# Patient Record
(demographics unavailable — no encounter records)

---

## 2024-11-07 NOTE — ASSESSMENT
[FreeTextEntry1] : Reviewed MRI in detail.  High-grade partial tear of the rotator cuff as well as injury to the long head of the biceps from the injury lifting heavy luggage.  The surrounding bursitis and the swelling around the biceps sheath again indicated this is an acute injury.  We discussed the findings.  Reasonable to start with conservative measures including a corticosteroid injection in the subacromial space as well as course of physical therapy.  If no improvement in 6 weeks could consider biceps sheath injection or eventually surgical repair.  He is currently working full duty by advised but I advised monitoring the pain to prevent the tear from getting worse.

## 2024-11-07 NOTE — PROCEDURE
[FreeTextEntry3] : - Large joint injection was performed of the left subacromial space.  - The indication for this procedure was pain and inflammation. Patient has tried OTC's including aspirin, Ibuprofen, Aleve, etc or prescription NSAIDS, and/or exercises at home and/or physical therapy without satisfactory response, patient had decreased mobility in the joint and the risks benefits, and alternatives have been discussed, and verbal consent was obtained. The site was prepped with alcohol, betadine, ethyl chloride sprayed topically and sterile technique used.  - An injection of Betamethasone 2cc; Marcaine 5cc injected. - Patient was advised to call if redness, pain or fever occur, apply ice for 15 minutes out of every hour for the next 12-24 hours as tolerated and patient was advised to rest the joint(s) for 2 days.

## 2024-11-07 NOTE — HISTORY OF PRESENT ILLNESS
[de-identified] : Follow up on the MRI Results of the left shoulder today due to WC DOI 9/8/24. Still c/o pain and inflammation in the shoulder with most motions. Currently working

## 2024-11-07 NOTE — IMAGING
[de-identified] : Left Shoulder Exam: Inspection: No swelling, no ecchymosis, no deformity, no scapular winging. Palpation: No tenderness to palpation over shoulder, AC joint or trapezius. Range of motion:  Forward flexion: Active 170 degrees Abduction: Active 150 degrees External rotation (with shoulder abducted): Passive 60 degrees Internal rotation (with shoulder abducted): Passive 70 degrees Strength: 4/5 supraspinatus, 5/5 infraspinatus and 5/5 subscapularis. Special testing: Negative Winston test, positive impingement sign, negative Lopez test, speeds and Yergason negative Motor and sensory intact distally

## 2024-12-19 NOTE — ASSESSMENT
[FreeTextEntry1] : Here for follow-up of High-grade partial tear of the rotator cuff as well as injury to the long head of the biceps from the injury lifting heavy luggage.  No relief with CSI injection done on 11/7/24 Would like to continue PT - new rx sent  If no improvement in 6 weeks could consider biceps sheath injection or eventually surgical repair.   currently working full duty  rtc in 6 weeks   I am working today under the supervision of Dr. Hayes and I am following the plan of care of Dr. Hayes as described by him on this date. Progress note completed by Latasha Cotto PA-C under the supervision of Dr. Hayes.

## 2024-12-19 NOTE — HISTORY OF PRESENT ILLNESS
[Full time] : Work status: full time [de-identified] : Follow up on the MRI Results of the left shoulder today due to WC DOI 9/8/24. Still c/o pain and inflammation in the shoulder with most motions. Currently working  12/19/24: patient is here for the left shoulder. Patient states that they still have pain in the shoulder but physical therapy has been helping with mobility.  [] : This patient has had an injection before: no [de-identified] : pt

## 2024-12-19 NOTE — IMAGING
[de-identified] : Left Shoulder Exam: Inspection: No swelling, no ecchymosis, no deformity, no scapular winging. Palpation: No tenderness to palpation over shoulder, AC joint or trapezius. Range of motion:  Forward flexion: Active 170 degrees Abduction: Active 150 degrees External rotation (with shoulder abducted): Passive 60 degrees Internal rotation (with shoulder abducted): Passive 70 degrees Strength: 4/5 supraspinatus, 5/5 infraspinatus and 5/5 subscapularis. Special testing: Negative Winston test, positive impingement sign, negative Lopez test, speeds and Yergason negative Motor and sensory intact distally

## 2025-02-27 NOTE — HISTORY OF PRESENT ILLNESS
[6] : 6 [5] : 5 [Dull/Aching] : dull/aching [Sharp] : sharp [Full time] : Work status: full time [de-identified] : 2/27/25; patient is here for a follow up of the left shoulder. pain has been slightly better since starting physical therapy.  [] : Post Surgical Visit: no [FreeTextEntry1] : left shoulder  [de-identified] : pt

## 2025-02-27 NOTE — IMAGING
[de-identified] : Left Shoulder Exam: Inspection: No swelling, no ecchymosis, no deformity, no scapular winging. Palpation: No tenderness to palpation over shoulder, AC joint or trapezius. Range of motion:  Forward flexion: Active 170 degrees Abduction: Active 150 degrees External rotation (with shoulder abducted): Passive 60 degrees Internal rotation (with shoulder abducted): Passive 70 degrees Strength: 4/5 supraspinatus, 5/5 infraspinatus and 5/5 subscapularis. Special testing: Negative Winston test, positive impingement sign, negative Lopez test, speeds and Yergason negative Motor and sensory intact distally

## 2025-02-27 NOTE — ASSESSMENT
[FreeTextEntry1] : Here for follow-up of High-grade partial tear of the rotator cuff as well as injury to the long head of the biceps from the injury lifting heavy luggage.  Has been making slow but steady progress with PT  No relief with CSI injection done on 11/7/24 Continue to request uninterrupted PT for strength and ROM  Would like to continue PT at this time  Ibuprofen 600mg sent - r/b/a discussed - instructed how to take medication If no improvement in 6 weeks could consider biceps sheath injection or eventually surgical repair.   currently working full duty  rtc in 6 weeks   I am working today under the supervision of Dr. Hayes and I am following the plan of care of Dr. Hayes as described by him on this date. Progress note completed by Latasha Cotto PA-C under the supervision of Dr. Hayes.

## 2025-04-16 NOTE — PROCEDURE
[FreeTextEntry3] : Injection was performed of the left biceps tendon sheath  - The indication for this procedure was pain and inflammation. Patient has tried OTC's including aspirin, Ibuprofen, Aleve, etc or prescription NSAIDS, and/or exercises at home and/or physical therapy without satisfactory response, patient had decreased mobility in the joint and the risks benefits, and alternatives have been discussed, and verbal consent was obtained. The site was prepped with alcohol, betadine, ethyl chloride sprayed topically and sterile technique used.  - An injection of Betamethasone 2cc; Marcaine 5cc injected. - Patient was advised to call if redness, pain or fever occur, apply ice for 15 minutes out of every hour for the next 12-24 hours as tolerated and patient was advised to rest the joint(s) for 2 days.  - Ultrasound guidance was indicated for this patient due to prior failure or difficult injection. All ultrasound images have been permanently captured and stored accordingly in our picture archiving and communication system. Visualization of the needle and placement of injection was performed without complication.

## 2025-04-16 NOTE — ASSESSMENT
[FreeTextEntry1] : Here for follow-up of High-grade partial tear of the rotator cuff as well as injury to the long head of the biceps from the injury lifting heavy luggage.  Has been making slow but steady progress with PT  No relief with CSI injection done on 11/7/24 Continue to request uninterrupted PT for strength and ROM  Would like to continue PT at this time  Can continue ibuprofen 600mg prn - side effects reviewed  Biceps sheath injection done today - tolerated procedure well - post procedure precautions discussed currently working full duty  rtc in 6 weeks   I am working today under the supervision of Dr. Hayes and I am following the plan of care of Dr. Hayes as described by him on this date. Progress note completed by Latasha Cotto PA-C under the supervision of Dr. Hayes.

## 2025-04-16 NOTE — IMAGING
[de-identified] : Left Shoulder Exam: Inspection: No swelling, no ecchymosis, no deformity, no scapular winging. Palpation: No tenderness to palpation over shoulder, AC joint or trapezius. Range of motion:  Forward flexion: Active 170 degrees Abduction: Active 150 degrees External rotation (with shoulder abducted): Passive 60 degrees Internal rotation (with shoulder abducted): Passive 70 degrees Strength: 4/5 supraspinatus, 5/5 infraspinatus and 5/5 subscapularis. Special testing: Negative Winston test, positive impingement sign, negative Lopez test, speeds and Yergason negative Motor and sensory intact distally

## 2025-04-16 NOTE — IMAGING
[de-identified] : Left Shoulder Exam: Inspection: No swelling, no ecchymosis, no deformity, no scapular winging. Palpation: No tenderness to palpation over shoulder, AC joint or trapezius. Range of motion:  Forward flexion: Active 170 degrees Abduction: Active 150 degrees External rotation (with shoulder abducted): Passive 60 degrees Internal rotation (with shoulder abducted): Passive 70 degrees Strength: 4/5 supraspinatus, 5/5 infraspinatus and 5/5 subscapularis. Special testing: Negative Winston test, positive impingement sign, negative Lopez test, speeds and Yergason negative Motor and sensory intact distally

## 2025-04-16 NOTE — REVIEW OF SYSTEMS
[Joint Pain] : joint pain [Joint Stiffness] : joint stiffness [Arthralgia] : no arthralgia [Joint Swelling] : no joint swelling

## 2025-04-16 NOTE — HISTORY OF PRESENT ILLNESS
[de-identified] : PT is here today for f/u on LT shoulder pain. PT states physical therapy is helping but still feels pain when sleeping. Pain scale is a 5. Pt is currently working.

## 2025-04-16 NOTE — HISTORY OF PRESENT ILLNESS
[de-identified] : PT is here today for f/u on LT shoulder pain. PT states physical therapy is helping but still feels pain when sleeping. Pain scale is a 5. Pt is currently working.

## 2025-05-22 NOTE — IMAGING
[de-identified] : Anterior and lateral tenderness palpation persist over the left shoulder Passive forward flexion 170, external rotation 80, internal rotation 40 NVID

## 2025-05-22 NOTE — ASSESSMENT
[FreeTextEntry1] : At this point likely reached maximal medical improvement.  He is not interested in any surgical intervention at this time.  He is working full duty.  Prescription tizanidine for the night symptoms.  He says the meloxicam has not been very helpful.  He will contact me if any new issues arise.  May follow-up as needed.    The patient's current medication management of their orthopedic diagnosis was reviewed today: There is a moderate risk of morbidity with further treatment, especially from use of prescription strength medications and possible side effects of these medications which include upset stomach with oral medications, skin reactions to topical medications and cardiac/renal/diabetes issues with long term use.

## 2025-05-22 NOTE — HISTORY OF PRESENT ILLNESS
[6] : 6 [Dull/Aching] : dull/aching [Sharp] : sharp [Full time] : Work status: full time [de-identified] : 5/22/25: Patient is here for a follow up of the left shoulder. Pain has been getting worse.  [] : Post Surgical Visit: no [FreeTextEntry1] : left shoulder